# Patient Record
Sex: FEMALE | Race: WHITE | NOT HISPANIC OR LATINO | Employment: OTHER | URBAN - METROPOLITAN AREA
[De-identification: names, ages, dates, MRNs, and addresses within clinical notes are randomized per-mention and may not be internally consistent; named-entity substitution may affect disease eponyms.]

---

## 2023-01-19 VITALS
DIASTOLIC BLOOD PRESSURE: 128 MMHG | OXYGEN SATURATION: 97 % | HEIGHT: 61 IN | TEMPERATURE: 98.1 F | RESPIRATION RATE: 18 BRPM | SYSTOLIC BLOOD PRESSURE: 191 MMHG | HEART RATE: 99 BPM

## 2023-01-20 ENCOUNTER — APPOINTMENT (EMERGENCY)
Dept: RADIOLOGY | Facility: HOSPITAL | Age: 73
End: 2023-01-20

## 2023-01-20 ENCOUNTER — HOSPITAL ENCOUNTER (EMERGENCY)
Facility: HOSPITAL | Age: 73
Discharge: HOME | End: 2023-01-20
Attending: EMERGENCY MEDICINE

## 2023-01-20 DIAGNOSIS — S61.452A DOG BITE, HAND, LEFT, INITIAL ENCOUNTER: ICD-10-CM
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
## 2023-01-20 DIAGNOSIS — S61.451A DOG BITE OF HAND, RIGHT, INITIAL ENCOUNTER: Primary | ICD-10-CM

## 2023-01-20 LAB
BASE EXCESS BLDA CALC-SCNC: -3 MMOL/L (ref -2–3)
CA-I BLD-SCNC: 1.27 MMOL/L (ref 1.12–1.32)
GLUCOSE SERPL-MCNC: 102 MG/DL (ref 65–140)
HCO3 BLDA-SCNC: 23.6 MMOL/L (ref 24–30)
HCT VFR BLD CALC: 38 % (ref 34.8–46.1)
HGB BLDA-MCNC: 12.9 G/DL (ref 11.5–15.4)
PCO2 BLD: 25 MMOL/L (ref 21–32)
PCO2 BLD: 49.2 MM HG (ref 42–50)
PH BLD: 7.29 [PH] (ref 7.3–7.4)
PO2 BLD: 23 MM HG (ref 35–45)
POTASSIUM BLD-SCNC: 3.7 MMOL/L (ref 3.5–5.3)
SAO2 % BLD FROM PO2: 33 % (ref 60–85)
SODIUM BLD-SCNC: 134 MMOL/L (ref 136–145)
SPECIMEN SOURCE: ABNORMAL

## 2023-01-20 RX ORDER — AZITHROMYCIN 500 MG/1
500 TABLET, FILM COATED ORAL ONCE
Status: COMPLETED | OUTPATIENT
Start: 2023-01-20 | End: 2023-01-20

## 2023-01-20 RX ORDER — AZITHROMYCIN 250 MG/1
TABLET, FILM COATED ORAL
Qty: 6 TABLET | Refills: 0 | Status: SHIPPED | OUTPATIENT
Start: 2023-01-20 | End: 2023-01-20 | Stop reason: SDUPTHER

## 2023-01-20 RX ORDER — AZITHROMYCIN 250 MG/1
TABLET, FILM COATED ORAL
Qty: 6 TABLET | Refills: 0 | Status: SHIPPED | OUTPATIENT
Start: 2023-01-20 | End: 2023-01-24

## 2023-01-20 RX ADMIN — AZITHROMYCIN MONOHYDRATE 500 MG: 500 TABLET ORAL at 02:35

## 2023-01-20 NOTE — ED PROVIDER NOTES
History  Chief Complaint   Patient presents with   • Dog Bite     Pt was lying in bed with dog and dog got startled, multiple bite marks to bilateral hands; dog UTD on vaccines, pt UTD on tetanus     70-year-old female with past medical history of hypertension presents for evaluation of multiple puncture wounds to bilateral hands after she startled her [place] when the dog was laying in bed with her  She washed the hands briefly and came to the emergency department  History of rash to penicillin and possible toxic shock syndrome later on so she has not taken any penicillin family antibiotics  Also states that she was told not to take clindamycin secondary to GI issues  No history of immunocompromise,  Medications taken prior to arrival   Tetanus up-to-date, the dog's shots are up-to-date          None       Past Medical History:   Diagnosis Date   • Hypertension        Past Surgical History:   Procedure Laterality Date   • APPENDECTOMY         History reviewed  No pertinent family history  I have reviewed and agree with the history as documented  E-Cigarette/Vaping   • E-Cigarette Use Never User      E-Cigarette/Vaping Substances     Social History     Tobacco Use   • Smoking status: Never   • Smokeless tobacco: Never   Vaping Use   • Vaping Use: Never used   Substance Use Topics   • Alcohol use: Never   • Drug use: Never       Review of Systems    Physical Exam  Physical Exam  Vitals and nursing note reviewed  Constitutional:       Appearance: She is well-developed  HENT:      Head: Normocephalic and atraumatic  Cardiovascular:      Rate and Rhythm: Normal rate and regular rhythm  Heart sounds: No murmur heard  No friction rub  No gallop  Pulmonary:      Effort: Pulmonary effort is normal       Breath sounds: No wheezing or rales  Chest:      Chest wall: No tenderness  Abdominal:      General: There is no distension  Palpations: Abdomen is soft  There is no mass  Tenderness: There is no guarding or rebound  Musculoskeletal:      Comments: Multiple puncture wounds and abrasions to bilateral hands, dorsal and plantar surface, otherwise neurovascularly intact, no obvious gaping lacerations requiring repair   Skin:     General: Skin is warm and dry  Neurological:      Mental Status: She is alert and oriented to person, place, and time           Vital Signs  ED Triage Vitals [01/19/23 2324]   Temperature Pulse Respirations Blood Pressure SpO2   98 1 °F (36 7 °C) 99 18 (!) 191/128 97 %      Temp Source Heart Rate Source Patient Position - Orthostatic VS BP Location FiO2 (%)   Temporal Monitor Sitting Right arm --      Pain Score       2           Vitals:    01/19/23 2324   BP: (!) 191/128   Pulse: 99   Patient Position - Orthostatic VS: Sitting         Visual Acuity      ED Medications  Medications   azithromycin (ZITHROMAX) tablet 500 mg (500 mg Oral Given 1/20/23 0235)       Diagnostic Studies  Results Reviewed     Procedure Component Value Units Date/Time    POCT Blood Gas (CG8+) [[id number]]  (Abnormal) Collected: 01/20/23 0417    Lab Status: Final result Specimen: Venous Updated: 01/20/23 0422     ph, Deniz ISTAT 7 289     pCO2, Deniz i-STAT 49 2 mm HG      pO2, Deniz i-STAT 23 0 mm HG      BE, i-STAT -3 mmol/L      HCO3, Deniz i-STAT 23 6 mmol/L      CO2, i-STAT 25 mmol/L      O2 Sat, i-STAT 33 %      SODIUM, I-STAT 134 mmol/l      Potassium, i-STAT 3 7 mmol/L      Calcium, Ionized i-STAT 1 27 mmol/L      Hct, i-STAT 38 %      Hgb, i-STAT 12 9 g/dl      Glucose, i-STAT 102 mg/dl      Specimen Type VENOUS                 XR hand 3+ views RIGHT   ED Interpretation by Lana George DO (01/20 0267)   This study was ordered and independently reviewed by me    No acute findings noted         XR hand 3+ views LEFT   ED Interpretation by Lana George DO (01/20 1255)   This study was ordered and independently reviewed by me    No acute findings noted Procedures  Procedures         ED Course                                             Medical Decision Making  15-year-old female with multiple puncture wounds bilateral hands in setting of dog bite, will cleanse thoroughly, x-ray to evaluate for retained foreign body, fracture, discussed at length antibiotic choice to prevent infection given multiple high risk wounds on both hands patient does not feel comfortable taking clindamycin, she reports questionable severe reaction to penicillin so does not feel comfortable taking a cephalosporin, would like to start with azithromycin knowing that it does have limited coverage for certain bacterial species and she will watch closely for development of infectious symptomology, we will clean her wounds out thoroughly, careful return precautions thoroughly reiterated    Dog bite of hand, right, initial encounter: acute illness or injury  Dog bite, hand, left, initial encounter: acute illness or injury  Amount and/or Complexity of Data Reviewed  Radiology: ordered and independent interpretation performed  Risk  Prescription drug management  Disposition  Final diagnoses:   Dog bite of hand, right, initial encounter   Dog bite, hand, left, initial encounter     Time reflects when diagnosis was documented in both MDM as applicable and the Disposition within this note     Time User Action Codes Description Comment    1/20/2023  1:56 AM Nery Carlos Add [U85 [id number],  X54  0XXA] Dog bite of hand, right, initial encounter     1/20/2023  1:56 AM Whiting Carlos Add [[id number],  W54  0XXA] Dog bite, hand, left, initial encounter       ED Disposition     ED Disposition   Discharge    Condition   Stable    Date/Time   Fri Jan 20, 2023  1:59 AM    Comment   Shelli Mullen discharge to home/self care                 Follow-up Information     Follow up With Specialties Details Why Contact Info Rafael [facility] [address] [facility] Emergency Medicine  If symptoms worsen [address] [place] [id number]  [address] [facility] Emergency Department, [address] [place], [place] [address]          Discharge Medication List as of 1/20/2023  2:11 AM      CONTINUE these medications which have CHANGED    Details   azithromycin (Zithromax Z-José) 250 mg tablet Take 2 tablets today then 1 tablet daily x 4 days, Normal             No discharge procedures on file      PDMP Review     None          ED Provider  Electronically Signed by           Joie Wharton DO  01/20/23 4921

## 2023-12-01 ENCOUNTER — TELEPHONE (OUTPATIENT)
Age: 73
End: 2023-12-01

## 2023-12-01 NOTE — TELEPHONE ENCOUNTER
Rec'd call from patient requesting NEW for FULL BODY / [place] / HX OF [facility]. Explained wait/cancellation list processes and MyChart notification. Understanding was verbalized. Patient declined MyChart Activation Link at this time. Created wait/cancellation list for patient. (Patient states that she's choosing [facility] due to she doesn't want to wait past end of Dec for appt. I explained that if I scheduled her for [facility], NEW patient - probably March or April. Patient states that she doesn't want to wait that long - she is YEARS overdue for full body check and now she's concerned. Offered potential appt at [facility] in [place]. She stated too far to drive. Refused [facility].)    Patient states that she was going to call around. I stated that when I contacted her for scheduling, if she has already been taken care of elsewhere, great. If not, I'll get her scheduled.